# Patient Record
Sex: MALE | Race: WHITE | Employment: OTHER | URBAN - METROPOLITAN AREA
[De-identification: names, ages, dates, MRNs, and addresses within clinical notes are randomized per-mention and may not be internally consistent; named-entity substitution may affect disease eponyms.]

---

## 2018-11-16 ENCOUNTER — HOSPITAL ENCOUNTER (EMERGENCY)
Age: 63
Discharge: ANOTHER HOSPITAL | End: 2018-11-16
Attending: STUDENT IN AN ORGANIZED HEALTH CARE EDUCATION/TRAINING PROGRAM
Payer: COMMERCIAL

## 2018-11-16 ENCOUNTER — APPOINTMENT (OUTPATIENT)
Dept: CT IMAGING | Age: 63
End: 2018-11-16
Payer: COMMERCIAL

## 2018-11-16 VITALS
DIASTOLIC BLOOD PRESSURE: 84 MMHG | SYSTOLIC BLOOD PRESSURE: 147 MMHG | RESPIRATION RATE: 20 BRPM | OXYGEN SATURATION: 95 % | WEIGHT: 210 LBS | HEART RATE: 87 BPM | HEIGHT: 71 IN | TEMPERATURE: 97.4 F | BODY MASS INDEX: 29.4 KG/M2

## 2018-11-16 DIAGNOSIS — J18.9 POSTOPERATIVE PNEUMONIA: Primary | ICD-10-CM

## 2018-11-16 DIAGNOSIS — J95.89 POSTOPERATIVE PNEUMONIA: Primary | ICD-10-CM

## 2018-11-16 DIAGNOSIS — E11.65 TYPE 2 DIABETES MELLITUS WITH HYPERGLYCEMIA, UNSPECIFIED WHETHER LONG TERM INSULIN USE (HCC): ICD-10-CM

## 2018-11-16 DIAGNOSIS — J90 PLEURAL EFFUSION: ICD-10-CM

## 2018-11-16 DIAGNOSIS — D72.829 LEUKOCYTOSIS, UNSPECIFIED TYPE: ICD-10-CM

## 2018-11-16 DIAGNOSIS — R10.13 ABDOMINAL PAIN, EPIGASTRIC: ICD-10-CM

## 2018-11-16 DIAGNOSIS — Z87.898 HX OF NAUSEA AND VOMITING: ICD-10-CM

## 2018-11-16 LAB
ALBUMIN SERPL-MCNC: 4.1 G/DL (ref 3.9–4.9)
ALP BLD-CCNC: 118 U/L (ref 35–104)
ALT SERPL-CCNC: 10 U/L (ref 0–41)
ANION GAP SERPL CALCULATED.3IONS-SCNC: 15 MEQ/L (ref 7–13)
APTT: 23.7 SEC (ref 21.6–35.4)
AST SERPL-CCNC: 11 U/L (ref 0–40)
BASOPHILS ABSOLUTE: 0.1 K/UL (ref 0–0.2)
BASOPHILS RELATIVE PERCENT: 0.3 %
BILIRUB SERPL-MCNC: 0.6 MG/DL (ref 0–1.2)
BILIRUBIN URINE: ABNORMAL
BLOOD, URINE: NEGATIVE
BUN BLDV-MCNC: 13 MG/DL (ref 8–23)
C-REACTIVE PROTEIN, HIGH SENSITIVITY: 4.6 MG/L (ref 0–5)
CALCIUM SERPL-MCNC: 9.7 MG/DL (ref 8.6–10.2)
CHLORIDE BLD-SCNC: 99 MEQ/L (ref 98–107)
CLARITY: CLEAR
CO2: 27 MEQ/L (ref 22–29)
COLOR: YELLOW
CREAT SERPL-MCNC: 0.76 MG/DL (ref 0.7–1.2)
EKG ATRIAL RATE: 81 BPM
EKG P AXIS: 56 DEGREES
EKG P-R INTERVAL: 150 MS
EKG Q-T INTERVAL: 398 MS
EKG QRS DURATION: 92 MS
EKG QTC CALCULATION (BAZETT): 462 MS
EKG R AXIS: -2 DEGREES
EKG T AXIS: 88 DEGREES
EKG VENTRICULAR RATE: 81 BPM
EOSINOPHILS ABSOLUTE: 0.2 K/UL (ref 0–0.7)
EOSINOPHILS RELATIVE PERCENT: 1 %
GFR AFRICAN AMERICAN: >60
GFR NON-AFRICAN AMERICAN: >60
GLOBULIN: 3 G/DL (ref 2.3–3.5)
GLUCOSE BLD-MCNC: 214 MG/DL (ref 74–109)
GLUCOSE URINE: NEGATIVE MG/DL
HCT VFR BLD CALC: 39.8 % (ref 42–52)
HEMOGLOBIN: 13.2 G/DL (ref 14–18)
INR BLD: 1
KETONES, URINE: ABNORMAL MG/DL
LACTIC ACID: 1.8 MMOL/L (ref 0.5–2.2)
LEUKOCYTE ESTERASE, URINE: NEGATIVE
LIPASE: 94 U/L (ref 13–60)
LYMPHOCYTES ABSOLUTE: 1 K/UL (ref 1–4.8)
LYMPHOCYTES RELATIVE PERCENT: 6.5 %
MAGNESIUM: 2.1 MG/DL (ref 1.7–2.3)
MCH RBC QN AUTO: 28.8 PG (ref 27–31.3)
MCHC RBC AUTO-ENTMCNC: 33.2 % (ref 33–37)
MCV RBC AUTO: 86.6 FL (ref 80–100)
MONOCYTES ABSOLUTE: 0.4 K/UL (ref 0.2–0.8)
MONOCYTES RELATIVE PERCENT: 2.6 %
NEUTROPHILS ABSOLUTE: 13.8 K/UL (ref 1.4–6.5)
NEUTROPHILS RELATIVE PERCENT: 89.6 %
NITRITE, URINE: NEGATIVE
PDW BLD-RTO: 13.8 % (ref 11.5–14.5)
PH UA: 5 (ref 5–9)
PLATELET # BLD: 349 K/UL (ref 130–400)
POTASSIUM SERPL-SCNC: 4.6 MEQ/L (ref 3.5–5.1)
PRO-BNP: 459 PG/ML
PROTEIN UA: NEGATIVE MG/DL
PROTHROMBIN TIME: 10.4 SEC (ref 9.6–12.3)
RBC # BLD: 4.6 M/UL (ref 4.7–6.1)
SODIUM BLD-SCNC: 141 MEQ/L (ref 132–144)
SPECIFIC GRAVITY UA: >=1.03 (ref 1–1.03)
TOTAL CK: 38 U/L (ref 0–190)
TOTAL PROTEIN: 7.1 G/DL (ref 6.4–8.1)
TROPONIN: <0.01 NG/ML (ref 0–0.01)
URINE REFLEX TO CULTURE: ABNORMAL
URINE TYPE: ABNORMAL
UROBILINOGEN, URINE: 0.2 E.U./DL
WBC # BLD: 15.4 K/UL (ref 4.8–10.8)

## 2018-11-16 PROCEDURE — 96366 THER/PROPH/DIAG IV INF ADDON: CPT

## 2018-11-16 PROCEDURE — 6370000000 HC RX 637 (ALT 250 FOR IP): Performed by: STUDENT IN AN ORGANIZED HEALTH CARE EDUCATION/TRAINING PROGRAM

## 2018-11-16 PROCEDURE — 85610 PROTHROMBIN TIME: CPT

## 2018-11-16 PROCEDURE — 6360000004 HC RX CONTRAST MEDICATION: Performed by: STUDENT IN AN ORGANIZED HEALTH CARE EDUCATION/TRAINING PROGRAM

## 2018-11-16 PROCEDURE — 81003 URINALYSIS AUTO W/O SCOPE: CPT

## 2018-11-16 PROCEDURE — 87040 BLOOD CULTURE FOR BACTERIA: CPT

## 2018-11-16 PROCEDURE — 80053 COMPREHEN METABOLIC PANEL: CPT

## 2018-11-16 PROCEDURE — 85025 COMPLETE CBC W/AUTO DIFF WBC: CPT

## 2018-11-16 PROCEDURE — 99285 EMERGENCY DEPT VISIT HI MDM: CPT

## 2018-11-16 PROCEDURE — 86141 C-REACTIVE PROTEIN HS: CPT

## 2018-11-16 PROCEDURE — 6360000002 HC RX W HCPCS: Performed by: STUDENT IN AN ORGANIZED HEALTH CARE EDUCATION/TRAINING PROGRAM

## 2018-11-16 PROCEDURE — 36415 COLL VENOUS BLD VENIPUNCTURE: CPT

## 2018-11-16 PROCEDURE — 84484 ASSAY OF TROPONIN QUANT: CPT

## 2018-11-16 PROCEDURE — 2580000003 HC RX 258: Performed by: STUDENT IN AN ORGANIZED HEALTH CARE EDUCATION/TRAINING PROGRAM

## 2018-11-16 PROCEDURE — 96365 THER/PROPH/DIAG IV INF INIT: CPT

## 2018-11-16 PROCEDURE — 83605 ASSAY OF LACTIC ACID: CPT

## 2018-11-16 PROCEDURE — 83880 ASSAY OF NATRIURETIC PEPTIDE: CPT

## 2018-11-16 PROCEDURE — 83690 ASSAY OF LIPASE: CPT

## 2018-11-16 PROCEDURE — 83735 ASSAY OF MAGNESIUM: CPT

## 2018-11-16 PROCEDURE — 74177 CT ABD & PELVIS W/CONTRAST: CPT

## 2018-11-16 PROCEDURE — 85730 THROMBOPLASTIN TIME PARTIAL: CPT

## 2018-11-16 PROCEDURE — 93005 ELECTROCARDIOGRAM TRACING: CPT

## 2018-11-16 PROCEDURE — 82550 ASSAY OF CK (CPK): CPT

## 2018-11-16 PROCEDURE — 80061 LIPID PANEL: CPT

## 2018-11-16 PROCEDURE — 96367 TX/PROPH/DG ADDL SEQ IV INF: CPT

## 2018-11-16 PROCEDURE — 71275 CT ANGIOGRAPHY CHEST: CPT

## 2018-11-16 RX ORDER — MAGNESIUM OXIDE 400 MG/1
400 TABLET ORAL DAILY
COMMUNITY

## 2018-11-16 RX ORDER — FUROSEMIDE 20 MG/1
20 TABLET ORAL 2 TIMES DAILY
COMMUNITY

## 2018-11-16 RX ORDER — ROSUVASTATIN CALCIUM 20 MG/1
20 TABLET, COATED ORAL DAILY
COMMUNITY

## 2018-11-16 RX ORDER — OXYCODONE HYDROCHLORIDE AND ACETAMINOPHEN 5; 325 MG/1; MG/1
1 TABLET ORAL ONCE
Status: COMPLETED | OUTPATIENT
Start: 2018-11-16 | End: 2018-11-16

## 2018-11-16 RX ORDER — OXYCODONE HYDROCHLORIDE 5 MG/1
5 CAPSULE ORAL EVERY 4 HOURS PRN
COMMUNITY

## 2018-11-16 RX ORDER — ASPIRIN 81 MG/1
81 TABLET, CHEWABLE ORAL DAILY
COMMUNITY

## 2018-11-16 RX ORDER — 0.9 % SODIUM CHLORIDE 0.9 %
1000 INTRAVENOUS SOLUTION INTRAVENOUS ONCE
Status: COMPLETED | OUTPATIENT
Start: 2018-11-16 | End: 2018-11-16

## 2018-11-16 RX ORDER — POTASSIUM CHLORIDE 1.5 G/1.77G
20 POWDER, FOR SOLUTION ORAL 2 TIMES DAILY
COMMUNITY

## 2018-11-16 RX ORDER — ONDANSETRON 2 MG/ML
4 INJECTION INTRAMUSCULAR; INTRAVENOUS ONCE
Status: DISCONTINUED | OUTPATIENT
Start: 2018-11-16 | End: 2018-11-16 | Stop reason: HOSPADM

## 2018-11-16 RX ADMIN — VANCOMYCIN HYDROCHLORIDE 1500 MG: 1 INJECTION, POWDER, LYOPHILIZED, FOR SOLUTION INTRAVENOUS at 12:13

## 2018-11-16 RX ADMIN — IOPAMIDOL 100 ML: 755 INJECTION, SOLUTION INTRAVENOUS at 10:42

## 2018-11-16 RX ADMIN — PIPERACILLIN SODIUM AND TAZOBACTAM SODIUM 3.38 G: 3; .375 INJECTION, POWDER, LYOPHILIZED, FOR SOLUTION INTRAVENOUS at 11:45

## 2018-11-16 RX ADMIN — OXYCODONE HYDROCHLORIDE AND ACETAMINOPHEN 1 TABLET: 5; 325 TABLET ORAL at 12:15

## 2018-11-16 RX ADMIN — SODIUM CHLORIDE 1000 ML: 9 INJECTION, SOLUTION INTRAVENOUS at 10:01

## 2018-11-16 ASSESSMENT — ENCOUNTER SYMPTOMS
NAUSEA: 1
CHEST TIGHTNESS: 0
SINUS PRESSURE: 0
ABDOMINAL PAIN: 1
DIARRHEA: 0
COUGH: 0
TROUBLE SWALLOWING: 0
BACK PAIN: 0
VOMITING: 0
SHORTNESS OF BREATH: 1

## 2018-11-16 ASSESSMENT — PAIN DESCRIPTION - PAIN TYPE: TYPE: ACUTE PAIN

## 2018-11-16 ASSESSMENT — PAIN SCALES - GENERAL: PAINLEVEL_OUTOF10: 3

## 2018-11-16 ASSESSMENT — PAIN DESCRIPTION - LOCATION: LOCATION: ABDOMEN;BACK

## 2018-11-16 ASSESSMENT — PAIN DESCRIPTION - DESCRIPTORS: DESCRIPTORS: ACHING

## 2018-11-16 ASSESSMENT — PAIN DESCRIPTION - ORIENTATION: ORIENTATION: LEFT

## 2018-11-16 NOTE — ED NOTES
Patient accepted [facility]   Waiting on bed assignment and transport from transfer cntr .      Hoda Veronica  11/16/18 3411

## 2018-11-16 NOTE — ED NOTES
Transfer [facility] for transfer to [address] [place] [address]  11/16/18 6820  used

## 2018-11-16 NOTE — ED TRIAGE NOTES
Patient presents to ED with c/o left lower back pain and abd pain.   Also started having N/V today at [facility]

## 2018-11-16 NOTE — ED PROVIDER NOTES
effusion. Ill-defined groundglass opacity right upper lung may reflect changes of pulmonary edema. Median sternotomy. All CT scans at this facility use dose modulation, iterative reconstruction, and/or weight based dosing when appropriate to reduce radiation dose to as low as reasonably achievable. ED BEDSIDE ULTRASOUND:   Performed by ED Physician - none    LABS:  Labs Reviewed   CBC WITH AUTO DIFFERENTIAL - Abnormal; Notable for the following:        Result Value    WBC 15.4 (*)     RBC 4.60 (*)     Hemoglobin 13.2 (*)     Hematocrit 39.8 (*)     Neutrophils # 13.8 (*)     All other components within normal limits   COMPREHENSIVE METABOLIC PANEL - Abnormal; Notable for the following: Anion Gap 15 (*)     Glucose 214 (*)     Alkaline Phosphatase 118 (*)     All other components within normal limits   URINE RT REFLEX TO CULTURE - Abnormal; Notable for the following:     Bilirubin Urine SMALL (*)     Ketones, Urine TRACE (*)     All other components within normal limits   LIPASE - Abnormal; Notable for the following:     Lipase 94 (*)     All other components within normal limits   CULTURE BLOOD #1   CULTURE BLOOD #2   APTT   BRAIN NATRIURETIC PEPTIDE   CK   LACTIC ACID, PLASMA   MAGNESIUM   PROTIME-INR   TROPONIN   HIGH SENSITIVITY CRP   LIPID PANEL       All other labs were within normal range or not returned as of this dictation. EMERGENCY DEPARTMENT COURSE and DIFFERENTIAL DIAGNOSIS/MDM:   Vitals:    Vitals:    11/16/18 0922 11/16/18 1100   BP: (!) 177/88 (!) 147/84   Pulse: 83 87   Resp: 22 18   Temp: 97.4 °F (36.3 °C)    TempSrc: Oral    SpO2: 96% 95%   Weight: 210 lb (95.3 kg)    Height: 5' 11\" (1.803 m)            MDM  With complaints of epigastric abdominal pain numerous episodes of vomiting. The patient's lipase is mildly elevated however there is no CT evidence of pancreatitis. The patient has a postoperative pneumonia in the left lung field with a pleural effusion.

## 2018-11-21 LAB
BLOOD CULTURE, ROUTINE: NORMAL
CULTURE, BLOOD 2: NORMAL

## 2019-08-09 ENCOUNTER — APPOINTMENT (OUTPATIENT)
Dept: GENERAL RADIOLOGY | Age: 64
End: 2019-08-09
Payer: COMMERCIAL

## 2019-08-09 ENCOUNTER — APPOINTMENT (OUTPATIENT)
Dept: CT IMAGING | Age: 64
End: 2019-08-09
Payer: COMMERCIAL

## 2019-08-09 ENCOUNTER — HOSPITAL ENCOUNTER (EMERGENCY)
Age: 64
Discharge: HOME | End: 2019-08-09
Attending: INTERNAL MEDICINE
Payer: COMMERCIAL

## 2019-08-09 VITALS
DIASTOLIC BLOOD PRESSURE: 68 MMHG | HEART RATE: 77 BPM | SYSTOLIC BLOOD PRESSURE: 121 MMHG | BODY MASS INDEX: 30.13 KG/M2 | OXYGEN SATURATION: 95 % | RESPIRATION RATE: 20 BRPM | TEMPERATURE: 98.4 F | WEIGHT: 216 LBS

## 2019-08-09 DIAGNOSIS — R55 NEAR SYNCOPE: ICD-10-CM

## 2019-08-09 DIAGNOSIS — E86.0 DEHYDRATION: ICD-10-CM

## 2019-08-09 DIAGNOSIS — J18.9 PNEUMONIA OF LEFT LOWER LOBE DUE TO INFECTIOUS ORGANISM: Primary | ICD-10-CM

## 2019-08-09 DIAGNOSIS — R80.9 PROTEINURIA, UNSPECIFIED TYPE: ICD-10-CM

## 2019-08-09 LAB
ALBUMIN SERPL-MCNC: 4.2 G/DL (ref 3.5–4.6)
ALP BLD-CCNC: 79 U/L (ref 35–104)
ALT SERPL-CCNC: 19 U/L (ref 0–41)
ANION GAP SERPL CALCULATED.3IONS-SCNC: 13 MEQ/L (ref 9–15)
AST SERPL-CCNC: 22 U/L (ref 0–40)
BACTERIA: NORMAL /HPF
BASOPHILS ABSOLUTE: 0 K/UL (ref 0–0.2)
BASOPHILS RELATIVE PERCENT: 0.3 %
BILIRUB SERPL-MCNC: 1.1 MG/DL (ref 0.2–0.7)
BILIRUBIN URINE: ABNORMAL
BLOOD, URINE: NEGATIVE
BUN BLDV-MCNC: 14 MG/DL (ref 8–23)
CALCIUM SERPL-MCNC: 8.9 MG/DL (ref 8.5–9.9)
CHLORIDE BLD-SCNC: 96 MEQ/L (ref 95–107)
CLARITY: CLEAR
CO2: 27 MEQ/L (ref 20–31)
COLOR: YELLOW
CREAT SERPL-MCNC: 0.83 MG/DL (ref 0.7–1.2)
EKG ATRIAL RATE: 74 BPM
EKG P AXIS: 59 DEGREES
EKG P-R INTERVAL: 152 MS
EKG Q-T INTERVAL: 402 MS
EKG QRS DURATION: 86 MS
EKG QTC CALCULATION (BAZETT): 446 MS
EKG R AXIS: -23 DEGREES
EKG T AXIS: 44 DEGREES
EKG VENTRICULAR RATE: 74 BPM
EOSINOPHILS ABSOLUTE: 0 K/UL (ref 0–0.7)
EOSINOPHILS RELATIVE PERCENT: 0.1 %
EPITHELIAL CELLS, UA: NORMAL /HPF
GFR AFRICAN AMERICAN: >60
GFR NON-AFRICAN AMERICAN: >60
GLOBULIN: 3.1 G/DL (ref 2.3–3.5)
GLUCOSE BLD-MCNC: 150 MG/DL (ref 60–115)
GLUCOSE BLD-MCNC: 161 MG/DL (ref 70–99)
GLUCOSE URINE: NEGATIVE MG/DL
HCT VFR BLD CALC: 46 % (ref 42–52)
HEMOGLOBIN: 15.5 G/DL (ref 14–18)
KETONES, URINE: ABNORMAL MG/DL
LEUKOCYTE ESTERASE, URINE: NEGATIVE
LYMPHOCYTES ABSOLUTE: 0.8 K/UL (ref 1–4.8)
LYMPHOCYTES RELATIVE PERCENT: 10.8 %
MCH RBC QN AUTO: 29.6 PG (ref 27–31.3)
MCHC RBC AUTO-ENTMCNC: 33.7 % (ref 33–37)
MCV RBC AUTO: 87.8 FL (ref 80–100)
MONOCYTES ABSOLUTE: 0.8 K/UL (ref 0.2–0.8)
MONOCYTES RELATIVE PERCENT: 11.2 %
MUCUS: PRESENT
NEUTROPHILS ABSOLUTE: 5.8 K/UL (ref 1.4–6.5)
NEUTROPHILS RELATIVE PERCENT: 77.6 %
NITRITE, URINE: NEGATIVE
PDW BLD-RTO: 13.6 % (ref 11.5–14.5)
PERFORMED ON: ABNORMAL
PH UA: 5.5 (ref 5–9)
PLATELET # BLD: 164 K/UL (ref 130–400)
POTASSIUM SERPL-SCNC: 4.4 MEQ/L (ref 3.4–4.9)
PROTEIN UA: 30 MG/DL
RBC # BLD: 5.24 M/UL (ref 4.7–6.1)
RBC UA: NORMAL /HPF (ref 0–2)
SODIUM BLD-SCNC: 136 MEQ/L (ref 135–144)
SPECIFIC GRAVITY UA: >=1.03 (ref 1–1.03)
TOTAL PROTEIN: 7.3 G/DL (ref 6.3–8)
TROPONIN: <0.01 NG/ML (ref 0–0.01)
URINE REFLEX TO CULTURE: YES
UROBILINOGEN, URINE: 1 E.U./DL
WBC # BLD: 7.5 K/UL (ref 4.8–10.8)
WBC UA: NORMAL /HPF (ref 0–5)

## 2019-08-09 PROCEDURE — 6360000002 HC RX W HCPCS: Performed by: INTERNAL MEDICINE

## 2019-08-09 PROCEDURE — 71045 X-RAY EXAM CHEST 1 VIEW: CPT

## 2019-08-09 PROCEDURE — 2580000003 HC RX 258: Performed by: INTERNAL MEDICINE

## 2019-08-09 PROCEDURE — 85025 COMPLETE CBC W/AUTO DIFF WBC: CPT

## 2019-08-09 PROCEDURE — 70450 CT HEAD/BRAIN W/O DYE: CPT

## 2019-08-09 PROCEDURE — 96360 HYDRATION IV INFUSION INIT: CPT

## 2019-08-09 PROCEDURE — 36415 COLL VENOUS BLD VENIPUNCTURE: CPT

## 2019-08-09 PROCEDURE — 87086 URINE CULTURE/COLONY COUNT: CPT

## 2019-08-09 PROCEDURE — 80053 COMPREHEN METABOLIC PANEL: CPT

## 2019-08-09 PROCEDURE — 87040 BLOOD CULTURE FOR BACTERIA: CPT

## 2019-08-09 PROCEDURE — 99284 EMERGENCY DEPT VISIT MOD MDM: CPT

## 2019-08-09 PROCEDURE — 93005 ELECTROCARDIOGRAM TRACING: CPT

## 2019-08-09 PROCEDURE — 84484 ASSAY OF TROPONIN QUANT: CPT

## 2019-08-09 PROCEDURE — 81001 URINALYSIS AUTO W/SCOPE: CPT

## 2019-08-09 RX ORDER — LEVOFLOXACIN 750 MG/1
750 TABLET ORAL DAILY
Qty: 7 TABLET | Refills: 0 | Status: SHIPPED | OUTPATIENT
Start: 2019-08-09 | End: 2019-08-16

## 2019-08-09 RX ORDER — GABAPENTIN 100 MG/1
100 CAPSULE ORAL 3 TIMES DAILY
COMMUNITY

## 2019-08-09 RX ORDER — TAMSULOSIN HYDROCHLORIDE 0.4 MG/1
0.4 CAPSULE ORAL DAILY
COMMUNITY

## 2019-08-09 RX ORDER — CITALOPRAM 40 MG/1
40 TABLET ORAL DAILY
COMMUNITY

## 2019-08-09 RX ORDER — SODIUM CHLORIDE 0.9 % (FLUSH) 0.9 %
3 SYRINGE (ML) INJECTION EVERY 8 HOURS
Status: DISCONTINUED | OUTPATIENT
Start: 2019-08-09 | End: 2019-08-09 | Stop reason: HOSPADM

## 2019-08-09 RX ORDER — LEVOFLOXACIN 5 MG/ML
750 INJECTION, SOLUTION INTRAVENOUS ONCE
Status: COMPLETED | OUTPATIENT
Start: 2019-08-09 | End: 2019-08-09

## 2019-08-09 RX ORDER — METOPROLOL SUCCINATE 100 MG/1
100 TABLET, EXTENDED RELEASE ORAL DAILY
COMMUNITY

## 2019-08-09 RX ADMIN — LEVOFLOXACIN 750 MG: 5 INJECTION, SOLUTION INTRAVENOUS at 14:18

## 2019-08-09 RX ADMIN — Medication 3 ML: at 14:19

## 2019-08-09 NOTE — ED TRIAGE NOTES
Patient was outside talking to neighbor when he had a near syncopal episode where he felt nauseous, dizzy, and dry heaving and had trouble seeing. Then he felt better after the event lasted a couple seconds. He states he had loose stool today. He complains of chronic generalized aches/pains but nothing new. He is alert and talkative with his daughter at bedside.

## 2019-08-11 LAB — URINE CULTURE, ROUTINE: NORMAL

## 2019-08-14 LAB
BLOOD CULTURE, ROUTINE: NORMAL
CULTURE, BLOOD 2: NORMAL

## 2022-04-20 ENCOUNTER — HOSPITAL ENCOUNTER (EMERGENCY)
Age: 67
Discharge: HOME | End: 2022-04-20
Attending: EMERGENCY MEDICINE
Payer: COMMERCIAL

## 2022-04-20 VITALS
TEMPERATURE: 98.3 F | DIASTOLIC BLOOD PRESSURE: 88 MMHG | HEART RATE: 72 BPM | OXYGEN SATURATION: 96 % | BODY MASS INDEX: 31.5 KG/M2 | RESPIRATION RATE: 20 BRPM | SYSTOLIC BLOOD PRESSURE: 156 MMHG | WEIGHT: 225 LBS | HEIGHT: 71 IN

## 2022-04-20 DIAGNOSIS — L03.211 FACIAL CELLULITIS: ICD-10-CM

## 2022-04-20 DIAGNOSIS — R22.0 FACIAL SWELLING: Primary | ICD-10-CM

## 2022-04-20 LAB
CHP ED QC CHECK: YES
GLUCOSE BLD-MCNC: 146 MG/DL
GLUCOSE BLD-MCNC: 146 MG/DL (ref 70–99)
PERFORMED ON: ABNORMAL

## 2022-04-20 PROCEDURE — 6370000000 HC RX 637 (ALT 250 FOR IP): Performed by: EMERGENCY MEDICINE

## 2022-04-20 PROCEDURE — 6360000002 HC RX W HCPCS: Performed by: EMERGENCY MEDICINE

## 2022-04-20 PROCEDURE — 99283 EMERGENCY DEPT VISIT LOW MDM: CPT

## 2022-04-20 RX ORDER — DEXAMETHASONE 4 MG/1
8 TABLET ORAL ONCE
Status: COMPLETED | OUTPATIENT
Start: 2022-04-20 | End: 2022-04-20

## 2022-04-20 RX ORDER — CEPHALEXIN 500 MG/1
500 CAPSULE ORAL ONCE
Status: COMPLETED | OUTPATIENT
Start: 2022-04-20 | End: 2022-04-20

## 2022-04-20 RX ORDER — IBUPROFEN 600 MG/1
600 TABLET ORAL EVERY 8 HOURS PRN
Qty: 30 TABLET | Refills: 0 | Status: SHIPPED | OUTPATIENT
Start: 2022-04-20

## 2022-04-20 RX ORDER — IBUPROFEN 600 MG/1
600 TABLET ORAL ONCE
Status: COMPLETED | OUTPATIENT
Start: 2022-04-20 | End: 2022-04-20

## 2022-04-20 RX ORDER — CEPHALEXIN 500 MG/1
500 CAPSULE ORAL 4 TIMES DAILY
Qty: 28 CAPSULE | Refills: 0 | Status: SHIPPED | OUTPATIENT
Start: 2022-04-20 | End: 2022-04-27

## 2022-04-20 RX ADMIN — CEPHALEXIN 500 MG: 500 CAPSULE ORAL at 16:45

## 2022-04-20 RX ADMIN — DEXAMETHASONE 8 MG: 4 TABLET ORAL at 16:44

## 2022-04-20 RX ADMIN — IBUPROFEN 600 MG: 600 TABLET ORAL at 16:45

## 2022-04-20 ASSESSMENT — ENCOUNTER SYMPTOMS
CHEST TIGHTNESS: 0
DIARRHEA: 0
BACK PAIN: 0
COUGH: 0
COLOR CHANGE: 1
FACIAL SWELLING: 1
CHOKING: 0
STRIDOR: 0
EYE PAIN: 0
BLOOD IN STOOL: 0
VOMITING: 0
WHEEZING: 0
SINUS PRESSURE: 0
SORE THROAT: 0
VOICE CHANGE: 0
CONSTIPATION: 0
ABDOMINAL PAIN: 0
EYE REDNESS: 0
EYE DISCHARGE: 0
TROUBLE SWALLOWING: 0
SHORTNESS OF BREATH: 0

## 2022-04-20 ASSESSMENT — PAIN DESCRIPTION - DESCRIPTORS: DESCRIPTORS: ACHING

## 2022-04-20 ASSESSMENT — PAIN DESCRIPTION - LOCATION: LOCATION: EYE

## 2022-04-20 ASSESSMENT — PAIN DESCRIPTION - ORIENTATION: ORIENTATION: RIGHT

## 2022-04-20 ASSESSMENT — PAIN DESCRIPTION - FREQUENCY: FREQUENCY: CONTINUOUS

## 2022-04-20 ASSESSMENT — PAIN - FUNCTIONAL ASSESSMENT: PAIN_FUNCTIONAL_ASSESSMENT: 0-10

## 2022-04-20 ASSESSMENT — PAIN DESCRIPTION - PAIN TYPE: TYPE: ACUTE PAIN

## 2022-04-20 ASSESSMENT — PAIN SCALES - GENERAL: PAINLEVEL_OUTOF10: 6

## 2022-04-20 NOTE — ED PROVIDER NOTES
[address] ED  eMERGENCY dEPARTMENT eNCOUnter      Pt Name: Norah Bravo  MRN: [id number]  [place] 1955  Date of evaluation: 4/20/2022  Provider: Skip Carmona MD    [address]       Chief Complaint   Patient presents with    Abscess     right eye - stated Sunday         HISTORY OF PRESENT ILLNESS   (Location/Symptom, Timing/Onset,Context/Setting, Quality, Duration, Modifying Factors, Severity)  Note limiting factors. Norah Bravo is a 79 y.o. male who presents to the emergency department patient noted some swelling for the last 3 to 4 days time denies any insect bite using glasses and glasses has been rubbing on it started becoming painful no eye drainage no injury no fall denies any insect bite minimal itching to start with history of diabetes diverticulitis hyperlipidemia maintaining a few has no facial    HPI    NursingNotes were reviewed. REVIEW OF SYSTEMS    (2-9 systems for level 4, 10 or more for level 5)     Review of Systems   Constitutional: Negative. Negative for activity change and fever. HENT: Positive for facial swelling. Negative for congestion, drooling, mouth sores, nosebleeds, sinus pressure, sore throat, trouble swallowing and voice change. Eyes: Negative for pain, discharge, redness and visual disturbance. Respiratory: Negative for cough, choking, chest tightness, shortness of breath, wheezing and stridor. Cardiovascular: Negative for chest pain, palpitations and leg swelling. Gastrointestinal: Negative for abdominal pain, blood in stool, constipation, diarrhea and vomiting. Endocrine: Negative for cold intolerance, polyphagia and polyuria. Genitourinary: Negative for dysuria, flank pain, frequency, genital sores and urgency. Musculoskeletal: Negative for back pain, joint swelling, neck pain and neck stiffness. Skin: Positive for color change and rash. Negative for pallor.    Neurological: Negative for tremors, seizures, syncope, weakness, numbness and headaches. Hematological: Negative for adenopathy. Does not bruise/bleed easily. Psychiatric/Behavioral: Negative for agitation, behavioral problems, hallucinations and sleep disturbance. The patient is not hyperactive. All other systems reviewed and are negative. Except as noted above the remainder of the review of systems was reviewed and negative. PAST MEDICAL HISTORY     Past Medical History:   Diagnosis Date    [facility] mellitus [facility] [address]     Diverticulitis     Dyslipidemia     FH: CAD (coronary artery disease)     Hyperlipidemia     Hypertension     MI (myocardial infarction) ([facility] [address]     Obesity          SURGICALHISTORY       Past Surgical History:   Procedure Laterality Date    CARDIAC SURGERY      CORONARY ARTERY BYPASS GRAFT           CURRENT MEDICATIONS       Previous Medications    ASPIRIN 81 MG CHEWABLE TABLET    Take 81 mg by mouth daily    CITALOPRAM (CELEXA) 40 MG TABLET    Take 40 mg by mouth daily    FUROSEMIDE (LASIX) 20 MG TABLET    Take 20 mg by mouth 2 times daily    GABAPENTIN (NEURONTIN) 100 MG CAPSULE    Take 100 mg by mouth 3 times daily. LISINOPRIL-HYDROCHLOROTHIAZIDE (ZESTORETIC) 20-25 MG PER TABLET    Take 1 tablet by mouth daily. MAGNESIUM OXIDE (MAG-OX) 400 MG TABLET    Take 400 mg by mouth daily    METOPROLOL SUCCINATE (TOPROL XL) 100 MG EXTENDED RELEASE TABLET    Take 100 mg by mouth daily    METOPROLOL TARTRATE (LOPRESSOR) 25 MG TABLET    Take 25 mg by mouth 2 times daily    OXYCODONE 5 MG CAPSULE    Take 5 mg by mouth every 4 hours as needed for Pain. Lydia Tramaine POTASSIUM CHLORIDE (KLOR-CON) 20 MEQ PACKET    Take 20 mEq by mouth 2 times daily    ROSUVASTATIN (CRESTOR) 20 MG TABLET    Take 20 mg by mouth daily    SITAGLIPTIN-METFORMIN HCL (JANUMET PO)    Take by mouth    TAMSULOSIN (FLOMAX) 0.4 MG CAPSULE    Take 0.4 mg by mouth daily       ALLERGIES     Patient has no known allergies.     FAMILY HISTORY       Family History   Problem Relation Age of Onset    Diabetes Mother     High Blood Pressure Mother     Asthma Father           SOCIAL HISTORY       Social History     Socioeconomic History    Marital status:      Spouse name: Not on file    Number of children: Not on file    Years of education: Not on file    Highest education level: Not on file   Occupational History    Not on file   Tobacco Use    Smoking status: Never Smoker    Smokeless tobacco: Never Used   Substance and Sexual Activity    Alcohol use: No    Drug use: No    Sexual activity: Yes     Partners: Female   Other Topics Concern    Not on file   Social History Narrative    Not on file     Social Determinants of Health     Financial Resource Strain:     Difficulty of Paying Living Expenses: Not on file   Food Insecurity:     Worried About Running Out of Food in the Last Year: Not on file    [place] of Food in the Last Year: Not on file   Transportation Needs:     Lack of Transportation (Medical): Not on file    Lack of Transportation (Non-Medical):  Not on file   Physical Activity:     Days of Exercise per Week: Not on file    Minutes of Exercise per Session: Not on file   Stress:     Feeling of Stress : Not on file   Social Connections:     Frequency of Communication with Friends and Family: Not on file    Frequency of Social Gatherings with Friends and Family: Not on file    Attends Buddhist Services: Not on file    Active Member of [address] [place] [address] [employer] or Organizations: Not on file    Attends Club or Organization Meetings: Not on file    Marital Status: Not on file   Intimate Partner Violence:     Fear of Current or Ex-Partner: Not on file    Emotionally Abused: Not on file    Physically Abused: Not on file    Sexually Abused: Not on file   Housing Stability:     Unable to Pay for Housing in the Last Year: Not on file    Number of [place] in the Last Year: Not on file    Unstable Housing in the Last Year: Not on file       SCREENINGS    Juan Coma Scale  Eye Opening: Spontaneous  Best Verbal Response: Oriented  Best Motor Response: Obeys commands  [place] Coma Scale Score: 15 @FLOW([id number])@      PHYSICAL EXAM    (up to 7 for level 4, 8 or more for level 5)     ED Triage Vitals [04/20/22 1600]   BP Temp Temp Source Pulse Resp SpO2 Height Weight   (!) 156/88 98.3 °F (36.8 °C) Oral 72 20 96 % 5' 11\" (1.803 m) 225 lb (102.1 kg)       Physical Exam  Vitals and nursing note reviewed. Constitutional:       Appearance: Normal appearance. He is well-developed. Comments: Alert cooperative patient nontoxic appearance ambulatory   HENT:      Head: Normocephalic. Right Ear: Tympanic membrane, ear canal and external ear normal.      Left Ear: Tympanic membrane, ear canal and external ear normal.      Nose: Congestion and rhinorrhea present. Mouth/Throat:      Mouth: Mucous membranes are moist.      Pharynx: No oropharyngeal exudate or posterior oropharyngeal erythema. Eyes:      General:         Right eye: No discharge. Left eye: No discharge. Extraocular Movements: Extraocular movements intact. Pupils: Pupils are equal, round, and reactive to light. Comments: Patient is a  patient has infraorbital soft tissue swelling minimal erythema and tenderness to exam and there is no drainage no fluctuation patient has some scratch marks to the rubbing of his eyeglasses but there is no blister no necrotic area   Cardiovascular:      Rate and Rhythm: Normal rate. Heart sounds: Normal heart sounds. No murmur heard. No gallop. Pulmonary:      Effort: No respiratory distress. Breath sounds: Normal breath sounds. No wheezing. Abdominal:      General: Bowel sounds are normal.      Palpations: Abdomen is soft. There is no mass. Tenderness: There is no rebound. Musculoskeletal:         General: No swelling, tenderness, deformity or signs of injury. Normal range of motion. Cervical back: Neck supple. Right lower leg: No edema. CONSULTS:  None    PROCEDURES:  Unless otherwise noted below, none     Procedures    FINAL IMPRESSION      1. Facial swelling    2.  Facial cellulitis          DISPOSITION/PLAN   DISPOSITION        PATIENT REFERRED TO:  Julianna Gamez MD  [facility]  [address] [facility] [address] [phone]    In 2 days        DISCHARGE MEDICATIONS:  New Prescriptions    CEPHALEXIN (KEFLEX) 500 MG CAPSULE    Take 1 capsule by mouth 4 times daily for 7 days    IBUPROFEN (ADVIL;MOTRIN) 600 MG TABLET    Take 1 tablet by mouth every 8 hours as needed for Pain          (Please note that portions of this note were completed with a voice recognition program.  Efforts were made to edit the dictations but occasionally words are mis-transcribed.)    Paula Villeda MD (electronically signed)  Attending Emergency Physician       Paula Villeda MD  04/20/22 2024

## 2022-04-20 NOTE — ED TRIAGE NOTES
Patient presents to ED with c/o right eye swelling that started on Sunday - denies any injury.  Reports he went to see his primary MD and they sent him here for abx and jamar